# Patient Record
Sex: FEMALE | URBAN - METROPOLITAN AREA
[De-identification: names, ages, dates, MRNs, and addresses within clinical notes are randomized per-mention and may not be internally consistent; named-entity substitution may affect disease eponyms.]

---

## 2019-02-08 ENCOUNTER — APPOINTMENT (RX ONLY)
Dept: URBAN - METROPOLITAN AREA CLINIC 168 | Facility: CLINIC | Age: 69
Setting detail: DERMATOLOGY
End: 2019-02-08

## 2019-02-08 DIAGNOSIS — L12.0 BULLOUS PEMPHIGOID: ICD-10-CM

## 2019-02-08 PROCEDURE — ? ORDER TESTS

## 2019-03-29 ENCOUNTER — RX ONLY (OUTPATIENT)
Age: 69
Setting detail: RX ONLY
End: 2019-03-29

## 2019-03-29 RX ORDER — METHOTREXATE SODIUM 2.5 MG/1
5 TABLET ORAL WEEKLY
Qty: 10 | Refills: 0 | Status: ERX | COMMUNITY
Start: 2019-03-29

## 2019-04-24 ENCOUNTER — RX ONLY (OUTPATIENT)
Age: 69
Setting detail: RX ONLY
End: 2019-04-24

## 2019-04-24 RX ORDER — FOLIC ACID 1 MG
1 TABLET ORAL QD
Qty: 30 | Refills: 11 | Status: ERX | COMMUNITY
Start: 2019-04-24

## 2019-08-12 ENCOUNTER — APPOINTMENT (RX ONLY)
Dept: URBAN - METROPOLITAN AREA CLINIC 168 | Facility: CLINIC | Age: 69
Setting detail: DERMATOLOGY
End: 2019-08-12

## 2019-08-12 DIAGNOSIS — L12.0 BULLOUS PEMPHIGOID: ICD-10-CM | Status: WELL CONTROLLED

## 2019-08-12 PROBLEM — I10 ESSENTIAL (PRIMARY) HYPERTENSION: Status: ACTIVE | Noted: 2019-08-12

## 2019-08-12 PROBLEM — J45.909 UNSPECIFIED ASTHMA, UNCOMPLICATED: Status: ACTIVE | Noted: 2019-08-12

## 2019-08-12 PROCEDURE — 99214 OFFICE O/P EST MOD 30 MIN: CPT

## 2019-08-12 PROCEDURE — ? COUNSELING

## 2019-08-12 PROCEDURE — ? PRESCRIPTION

## 2019-08-12 PROCEDURE — ? ORDER TESTS

## 2019-08-12 RX ORDER — METHOTREXATE SODIUM 2.5 MG/1
6 TABLET ORAL QW
Qty: 72 | Refills: 2 | Status: ERX

## 2019-08-12 ASSESSMENT — PAIN INTENSITY VAS: HOW INTENSE IS YOUR PAIN 0 BEING NO PAIN, 10 BEING THE MOST SEVERE PAIN POSSIBLE?: 2/10 PAIN

## 2019-08-12 ASSESSMENT — SEVERITY ASSESSMENT: SEVERITY: MILD

## 2019-08-12 NOTE — PROCEDURE: COUNSELING
Patient Specific Counseling (Will Not Stick From Patient To Patient): ***\\nAdvised patient to continue on methotrexate 5 pills a week. At this time a generous estimate cumulative dose is ~ 1.5gm so we have 1-2 more years before we need to decide if we start screening her for cirrhosis with hepatology in order to continue MTX, vs. consider Rituxan or Xolair. She does have asthma, and so Xolair would treat both conditions. DIscussed the above treatment options and plan. For now she is still at a low cumulative dose, and not high risk for cirrhosis. \\n\\nRecommended she see her PCP and be sure she is up to date on age appropriate vaccines and reminded to not receive live vaccines.
Detail Level: Zone

## 2019-10-02 ENCOUNTER — APPOINTMENT (RX ONLY)
Dept: URBAN - METROPOLITAN AREA CLINIC 168 | Facility: CLINIC | Age: 69
Setting detail: DERMATOLOGY
End: 2019-10-02

## 2019-10-02 DIAGNOSIS — L12.0 BULLOUS PEMPHIGOID: ICD-10-CM

## 2019-10-02 PROCEDURE — ? ORDER TESTS

## 2020-01-24 ENCOUNTER — APPOINTMENT (RX ONLY)
Dept: URBAN - METROPOLITAN AREA CLINIC 168 | Facility: CLINIC | Age: 70
Setting detail: DERMATOLOGY
End: 2020-01-24

## 2020-01-24 DIAGNOSIS — L12.0 BULLOUS PEMPHIGOID: ICD-10-CM | Status: WELL CONTROLLED

## 2020-01-24 DIAGNOSIS — L71.8 OTHER ROSACEA: ICD-10-CM

## 2020-01-24 PROCEDURE — ? COUNSELING

## 2020-01-24 PROCEDURE — ? ADDITIONAL NOTES

## 2020-01-24 PROCEDURE — 99214 OFFICE O/P EST MOD 30 MIN: CPT

## 2020-01-24 PROCEDURE — ? ORDER TESTS

## 2020-01-24 ASSESSMENT — LOCATION SIMPLE DESCRIPTION DERM
LOCATION SIMPLE: LEFT CHEEK
LOCATION SIMPLE: RIGHT CHEEK

## 2020-01-24 ASSESSMENT — LOCATION DETAILED DESCRIPTION DERM
LOCATION DETAILED: LEFT CENTRAL MALAR CHEEK
LOCATION DETAILED: RIGHT CENTRAL MALAR CHEEK

## 2020-01-24 ASSESSMENT — PAIN INTENSITY VAS: HOW INTENSE IS YOUR PAIN 0 BEING NO PAIN, 10 BEING THE MOST SEVERE PAIN POSSIBLE?: 2/10 PAIN

## 2020-01-24 ASSESSMENT — SEVERITY ASSESSMENT: SEVERITY: CLEAR

## 2020-01-24 ASSESSMENT — LOCATION ZONE DERM: LOCATION ZONE: FACE

## 2020-01-24 NOTE — PROCEDURE: MIPS QUALITY
Quality 474: Zoster Vaccination Status: Shingrix Vaccination not Administered or Previously Received, Reason not Otherwise Specified
Detail Level: Detailed
Quality 226: Preventive Care And Screening: Tobacco Use: Screening And Cessation Intervention: Patient screened for tobacco use and is an ex/non-smoker
Quality 111:Pneumonia Vaccination Status For Older Adults: Pneumococcal Vaccination Previously Received
Quality 110: Preventive Care And Screening: Influenza Immunization: Influenza Immunization Administered during Influenza season
Quality 131: Pain Assessment And Follow-Up: Pain assessment NOT documented as being performed, documentation the patient is not eligible for a pain assessment using a standardized tool

## 2020-01-24 NOTE — PROCEDURE: COUNSELING
Patient Specific Counseling (Will Not Stick From Patient To Patient): **************\\nAdvised patient to continue on methotrexate 5 pills a week. At this time a generous estimate cumulative dose is ~ 1800mg , 1.8 so we have 1-2 more years before we need to decide if we start screening her for cirrhosis with hepatology in order to continue MTX, vs. consider Rituxan or Xolair. She does have asthma, and so Xolair would treat both conditions. DIscussed the above treatment options and plan. For now she is still at a low cumulative dose, and not high risk for cirrhosis. \\n\\Mishel her eyelid findings are somewhat non-specific, I recommended occuloplastics eval as bullous pemphigoid can effect the occular conjunctiva, and if it does, can progress to scaring and blindness. \\n\\nThe eyelid margins to me today looks most like inflamed sebaceous glands, however I defer to their exam, and consider biopsy of the conjunctiva for H&E in formalin, and a specimen for DIF submitted in [facility]'s media - with occuloplastics.\\n\\nFor the limited mouth involvement will start topical tacrolimus 0.01% rinse QID given her previous serious adverse effect with prolonged steroid use.
Detail Level: Zone
Detail Level: Detailed

## 2020-01-24 NOTE — PROCEDURE: ADDITIONAL NOTES
Additional Notes: Will request records from cataract surgery as she states a \"scraping\" was done at the time of the surgery.
Detail Level: Simple
Additional Notes: Recommended a plain bottle of Afrin mixed into Cerave lotion, apply a thin layer QD to reduce redness. Warned of potential rebound erythema and to try a test spot on her cheek for a few days in a row before treating her entire face.

## 2020-01-24 NOTE — HPI: BLISTERS (BULLOUS PEMPHIGOID)
How Severe Is It?: moderate
Is This A New Presentation, Or A Follow-Up?: Follow Up Bullous Pemphigoid
Additional History: She says she has had some irritation and redness of bilateral lower lateral eyelid margins. Her PCP referred her to oculoplastics, but she cancelled the appt as she was not certain she needed to be seen for this. \\n\\nShe notes periodic blisters in her mouth, not enough to increase her MTX dose, improved with topical colloidal silver, but it gives her GERD.

## 2020-07-24 ENCOUNTER — APPOINTMENT (RX ONLY)
Dept: URBAN - METROPOLITAN AREA CLINIC 168 | Facility: CLINIC | Age: 70
Setting detail: DERMATOLOGY
End: 2020-07-24

## 2020-07-24 DIAGNOSIS — L12.0 BULLOUS PEMPHIGOID: ICD-10-CM

## 2020-07-24 PROCEDURE — ? ORDER TESTS

## 2020-07-24 RX ORDER — METHOTREXATE SODIUM 2.5 MG/1
TABLET ORAL QW
Qty: 72 | Refills: 2 | Status: ERX

## 2020-08-26 ENCOUNTER — APPOINTMENT (RX ONLY)
Dept: URBAN - METROPOLITAN AREA CLINIC 168 | Facility: CLINIC | Age: 70
Setting detail: DERMATOLOGY
End: 2020-08-26

## 2020-08-26 DIAGNOSIS — L12.0 BULLOUS PEMPHIGOID: ICD-10-CM

## 2020-08-26 PROCEDURE — ? PRESCRIPTION

## 2020-08-26 PROCEDURE — ? TREATMENT REGIMEN

## 2020-08-26 PROCEDURE — ? COUNSELING

## 2020-08-26 PROCEDURE — 99214 OFFICE O/P EST MOD 30 MIN: CPT | Mod: 95

## 2020-08-26 PROCEDURE — ? ADDITIONAL NOTES

## 2020-08-26 RX ORDER — CLOBETASOL PROPIONATE 0.5 MG/G
1 GEL TOPICAL BID
Qty: 1 | Refills: 3 | Status: ERX | COMMUNITY
Start: 2020-08-26

## 2020-08-26 RX ADMIN — CLOBETASOL PROPIONATE 1: 0.5 GEL TOPICAL at 00:00

## 2020-08-26 NOTE — PROCEDURE: ADDITIONAL NOTES
Detail Level: Simple
Additional Notes: Patient tried and failed:\\n- prednisone - caused cardiac damage\\n- azathioprine 75mg BID\\n- Cellcept - dose unknown

## 2020-08-26 NOTE — PROCEDURE: TREATMENT REGIMEN
Detail Level: Zone
Plan: Discussed MTX can absolutely cause diarrhea, reduced appetite, and hair loss. I would not expect it to cause LE swelling though. \\n\\nDiscussed tapering off of MTX vs. d/c the med \"cold turkey\" and assess for improvement of symptoms. Discussed I would expect improvement within 3-4 weeks of d/c.\\n\\nAlso discussed changing to SQ MTX, which should reduce, if not resolve, the GI symptoms, but would not reverse hairloss. \\n\\nReviewed Xolair and Rituxan as alternate therapies to MTX, and the possibility of starting on samples of Xolair if symptoms do prove to cease with d/c of MTX. \\n\\nPatient states she prefers to d/c the MTX rather than taper, and we will meet in 3 weeks to discuss results, and potential switch to Xolair. Clobetasol refilled to use topically for any flare, and can send in tacrolimus solution to [place] pharmacy for any oral outbreaks. Can restart MTX temporarily for any flare while considering next option if severe.

## 2020-09-17 ENCOUNTER — APPOINTMENT (RX ONLY)
Dept: URBAN - METROPOLITAN AREA CLINIC 168 | Facility: CLINIC | Age: 70
Setting detail: DERMATOLOGY
End: 2020-09-17

## 2020-09-17 DIAGNOSIS — L12.0 BULLOUS PEMPHIGOID: ICD-10-CM | Status: RESOLVED

## 2020-09-17 PROCEDURE — 99213 OFFICE O/P EST LOW 20 MIN: CPT | Mod: 95

## 2020-09-17 PROCEDURE — ? ADDITIONAL NOTES

## 2020-09-17 PROCEDURE — ? COUNSELING

## 2020-09-17 PROCEDURE — ? ORDER TESTS

## 2020-09-17 ASSESSMENT — PAIN INTENSITY VAS: HOW INTENSE IS YOUR PAIN 0 BEING NO PAIN, 10 BEING THE MOST SEVERE PAIN POSSIBLE?: NO PAIN

## 2020-09-17 NOTE — PROCEDURE: ADDITIONAL NOTES
Detail Level: Simple
Additional Notes: Briefly discussed Xolair if she starts to blister again.\\n\\nPt can call for Rx to be sent in to compounding pharmacy again for tacrolimus suspension/oral rinse for small oral flares. \\n\\nIf more significant flares, discussed may need to start Xolair in conjunction with MTX (low dose to reduce diarrhea) or with prednisone. Would likely chose MTX given the cardiac effects she had from previous high-dose, long-term prednisone. \\n\\nPatient tried and failed:\\n- prednisone - caused cardiac damage\\n- azathioprine 75mg BID\\n- Cellcept - dose unknown

## 2020-11-18 ENCOUNTER — RX ONLY (OUTPATIENT)
Age: 70
Setting detail: RX ONLY
End: 2020-11-18

## 2020-11-18 RX ORDER — CLOBETASOL PROPIONATE 0.5 MG/G
1 GEL TOPICAL AS DIRECTED
Qty: 1 | Refills: 3 | COMMUNITY
Start: 2020-11-18

## 2020-12-16 ENCOUNTER — APPOINTMENT (RX ONLY)
Dept: URBAN - METROPOLITAN AREA CLINIC 168 | Facility: CLINIC | Age: 70
Setting detail: DERMATOLOGY
End: 2020-12-16

## 2020-12-16 DIAGNOSIS — L12.0 BULLOUS PEMPHIGOID: ICD-10-CM | Status: WORSENING

## 2020-12-16 PROCEDURE — ? COUNSELING

## 2020-12-16 PROCEDURE — 99214 OFFICE O/P EST MOD 30 MIN: CPT | Mod: 95

## 2020-12-16 PROCEDURE — ? ADDITIONAL NOTES

## 2020-12-16 PROCEDURE — ? ORDER TESTS

## 2020-12-16 ASSESSMENT — PAIN INTENSITY VAS: HOW INTENSE IS YOUR PAIN 0 BEING NO PAIN, 10 BEING THE MOST SEVERE PAIN POSSIBLE?: 2/10 PAIN

## 2020-12-16 NOTE — PROCEDURE: ADDITIONAL NOTES
Detail Level: Simple
Additional Notes: Will have labs drawn and decide what medication would be best to start.\\n\\nPatient tried and failed:\\n- prednisone - caused cardiac damage\\n- azathioprine 75mg BID\\n- Cellcept - dose unknown\\n\\nDiscussed that she has signs of venous insufficiency and so she should try to wear compression stockings, discussed the possibility of an ID reaction as well instead of recurring BP as an alternative dx. Will check antibodies and if not elevated then this may be the case, and thus starting rituxan would not be indicated, but rather controlling the edema would be critical. \\n\\nIf antibodies are significantly increased then this supports recurrent BP, and will then consider her Hep B serologies. If + then may need hepatology to consult on safety of use of Ritxan as it can reactivate Hep B. \\n\\nAlso briefly discussed Dapsone as an option to treat low-level BP activity if antibodies are only mildly elevated. \\n\\nWill decide next steps based on results and options above.

## 2020-12-22 ENCOUNTER — RX ONLY (OUTPATIENT)
Age: 70
Setting detail: RX ONLY
End: 2020-12-22

## 2020-12-22 RX ORDER — METHOTREXATE SODIUM 2.5 MG/1
1 TABLET ORAL AS DIRECTED
Qty: 15 | Refills: 0 | Status: ERX | COMMUNITY
Start: 2020-12-22

## 2020-12-29 ENCOUNTER — RX ONLY (OUTPATIENT)
Age: 70
Setting detail: RX ONLY
End: 2020-12-29

## 2020-12-29 ENCOUNTER — APPOINTMENT (RX ONLY)
Dept: URBAN - METROPOLITAN AREA CLINIC 168 | Facility: CLINIC | Age: 70
Setting detail: DERMATOLOGY
End: 2020-12-29

## 2020-12-29 DIAGNOSIS — L12.0 BULLOUS PEMPHIGOID: ICD-10-CM

## 2020-12-29 PROCEDURE — ? ORDER TESTS

## 2020-12-29 RX ORDER — PREDNISONE 10 MG/1
1 TABLET ORAL AS DIRECTED
Qty: 21 | Refills: 0 | Status: ERX | COMMUNITY
Start: 2020-12-29

## 2021-01-12 ENCOUNTER — RX ONLY (OUTPATIENT)
Age: 71
Setting detail: RX ONLY
End: 2021-01-12

## 2021-01-12 ENCOUNTER — APPOINTMENT (RX ONLY)
Dept: URBAN - METROPOLITAN AREA CLINIC 168 | Facility: CLINIC | Age: 71
Setting detail: DERMATOLOGY
End: 2021-01-12

## 2021-01-12 VITALS — TEMPERATURE: 98.1 F

## 2021-01-12 DIAGNOSIS — I87.2 VENOUS INSUFFICIENCY (CHRONIC) (PERIPHERAL): ICD-10-CM

## 2021-01-12 PROCEDURE — ? TREATMENT REGIMEN

## 2021-01-12 PROCEDURE — ? LAB REPORTS REVIEWED

## 2021-01-12 PROCEDURE — ? COUNSELING

## 2021-01-12 PROCEDURE — 99214 OFFICE O/P EST MOD 30 MIN: CPT

## 2021-01-12 PROCEDURE — ? PRESCRIPTION

## 2021-01-12 RX ORDER — CLOBETASOL PROPIONATE 0.5 MG/G
1 CREAM TOPICAL BID
Qty: 1 | Refills: 1 | Status: CANCELLED | COMMUNITY
Start: 2021-01-12

## 2021-01-12 RX ORDER — CLOBETASOL PROPIONATE 0.5 MG/G
1 CREAM TOPICAL BID
Qty: 1 | Refills: 1 | COMMUNITY
Start: 2021-01-12

## 2021-01-12 RX ORDER — PENTOXIFYLLINE 400 MG/1
1 TABLET, EXTENDED RELEASE ORAL TID
Qty: 90 | Refills: 6 | Status: CANCELLED | COMMUNITY
Start: 2021-01-12

## 2021-01-12 RX ADMIN — CLOBETASOL PROPIONATE 1: 0.5 CREAM TOPICAL at 00:00

## 2021-01-12 RX ADMIN — PENTOXIFYLLINE 1: 400 TABLET, EXTENDED RELEASE ORAL at 00:00

## 2021-01-12 ASSESSMENT — LOCATION ZONE DERM: LOCATION ZONE: LEG

## 2021-01-12 ASSESSMENT — LOCATION SIMPLE DESCRIPTION DERM
LOCATION SIMPLE: LEFT PRETIBIAL REGION
LOCATION SIMPLE: RIGHT PRETIBIAL REGION

## 2021-01-12 ASSESSMENT — LOCATION DETAILED DESCRIPTION DERM
LOCATION DETAILED: LEFT DISTAL PRETIBIAL REGION
LOCATION DETAILED: RIGHT DISTAL PRETIBIAL REGION

## 2021-01-12 NOTE — PROCEDURE: TREATMENT REGIMEN
Otc Regimen: Can begin with ace bandages for compression for tolerance, then switch to Compression stockings ace bandages when she is able to get her itching under control. \\n\\nWhen using ace bandages I advised to be cautious to wrap more snuggly over the feet and ankles with lesser compression as she moves up the leg.
Plan: Given her condition worsened with worsening of LE edema, erythema and induration, and BP antibodies are normal, that she has venous stasis dermatitis and lipodermatosclerosis which are triggering an autoeczematization reaction rather than BP flare. \\n\\nReferred to Dr. Sanya Davis for vascular evaluation.
Initiate Treatment: Clobetasol cream twice a day for 2 weeks.  Pentoxyfylline three times a day. Advised of possible loose stools with pentoxyfylline, and to reduce dose to BID if this occurs.
Detail Level: Zone